# Patient Record
Sex: MALE | Race: WHITE
[De-identification: names, ages, dates, MRNs, and addresses within clinical notes are randomized per-mention and may not be internally consistent; named-entity substitution may affect disease eponyms.]

---

## 2018-11-23 ENCOUNTER — HOSPITAL ENCOUNTER (INPATIENT)
Dept: HOSPITAL 97 - ER | Age: 73
LOS: 1 days | Discharge: HOME | DRG: 641 | End: 2018-11-24
Attending: FAMILY MEDICINE | Admitting: FAMILY MEDICINE
Payer: COMMERCIAL

## 2018-11-23 DIAGNOSIS — I95.9: ICD-10-CM

## 2018-11-23 DIAGNOSIS — E86.0: Primary | ICD-10-CM

## 2018-11-23 DIAGNOSIS — Z88.0: ICD-10-CM

## 2018-11-23 DIAGNOSIS — Z79.82: ICD-10-CM

## 2018-11-23 DIAGNOSIS — N28.1: ICD-10-CM

## 2018-11-23 DIAGNOSIS — K20.9: ICD-10-CM

## 2018-11-23 DIAGNOSIS — E78.49: ICD-10-CM

## 2018-11-23 DIAGNOSIS — K29.70: ICD-10-CM

## 2018-11-23 DIAGNOSIS — R19.7: ICD-10-CM

## 2018-11-23 DIAGNOSIS — I10: ICD-10-CM

## 2018-11-23 DIAGNOSIS — R55: ICD-10-CM

## 2018-11-23 LAB
ALBUMIN SERPL BCP-MCNC: 3.4 G/DL (ref 3.4–5)
ALP SERPL-CCNC: 69 U/L (ref 45–117)
ALT SERPL W P-5'-P-CCNC: 27 U/L (ref 12–78)
AST SERPL W P-5'-P-CCNC: 19 U/L (ref 15–37)
BUN BLD-MCNC: 18 MG/DL (ref 7–18)
GLUCOSE SERPLBLD-MCNC: 115 MG/DL (ref 74–106)
HCT VFR BLD CALC: 47.8 % (ref 39.6–49)
INR BLD: 1.02
LYMPHOCYTES # SPEC AUTO: 0.8 K/UL (ref 0.7–4.9)
MAGNESIUM SERPL-MCNC: 2.2 MG/DL (ref 1.8–2.4)
MCH RBC QN AUTO: 33.9 PG (ref 27–35)
MCV RBC: 97.4 FL (ref 80–100)
NT-PROBNP SERPL-MCNC: 73 PG/ML (ref ?–125)
PMV BLD: 9.5 FL (ref 7.6–11.3)
POTASSIUM SERPL-SCNC: 4.2 MMOL/L (ref 3.5–5.1)
RBC # BLD: 4.91 M/UL (ref 4.33–5.43)
TROPONIN (EMERG DEPT USE ONLY): < 0.02 NG/ML (ref 0–0.04)

## 2018-11-23 PROCEDURE — 97163 PT EVAL HIGH COMPLEX 45 MIN: CPT

## 2018-11-23 PROCEDURE — 84484 ASSAY OF TROPONIN QUANT: CPT

## 2018-11-23 PROCEDURE — 81003 URINALYSIS AUTO W/O SCOPE: CPT

## 2018-11-23 PROCEDURE — 96361 HYDRATE IV INFUSION ADD-ON: CPT

## 2018-11-23 PROCEDURE — 70450 CT HEAD/BRAIN W/O DYE: CPT

## 2018-11-23 PROCEDURE — 80076 HEPATIC FUNCTION PANEL: CPT

## 2018-11-23 PROCEDURE — 83735 ASSAY OF MAGNESIUM: CPT

## 2018-11-23 PROCEDURE — 96367 TX/PROPH/DG ADDL SEQ IV INF: CPT

## 2018-11-23 PROCEDURE — 96375 TX/PRO/DX INJ NEW DRUG ADDON: CPT

## 2018-11-23 PROCEDURE — 74177 CT ABD & PELVIS W/CONTRAST: CPT

## 2018-11-23 PROCEDURE — 85025 COMPLETE CBC W/AUTO DIFF WBC: CPT

## 2018-11-23 PROCEDURE — 93880 EXTRACRANIAL BILAT STUDY: CPT

## 2018-11-23 PROCEDURE — 72125 CT NECK SPINE W/O DYE: CPT

## 2018-11-23 PROCEDURE — 99285 EMERGENCY DEPT VISIT HI MDM: CPT

## 2018-11-23 PROCEDURE — 36415 COLL VENOUS BLD VENIPUNCTURE: CPT

## 2018-11-23 PROCEDURE — 80048 BASIC METABOLIC PNL TOTAL CA: CPT

## 2018-11-23 PROCEDURE — 71045 X-RAY EXAM CHEST 1 VIEW: CPT

## 2018-11-23 PROCEDURE — 85610 PROTHROMBIN TIME: CPT

## 2018-11-23 PROCEDURE — 80053 COMPREHEN METABOLIC PANEL: CPT

## 2018-11-23 PROCEDURE — 93005 ELECTROCARDIOGRAM TRACING: CPT

## 2018-11-23 PROCEDURE — 83880 ASSAY OF NATRIURETIC PEPTIDE: CPT

## 2018-11-23 PROCEDURE — 96365 THER/PROPH/DIAG IV INF INIT: CPT

## 2018-11-23 RX ADMIN — SODIUM CHLORIDE SCH MLS: 0.9 INJECTION, SOLUTION INTRAVENOUS at 23:20

## 2018-11-23 RX ADMIN — SODIUM CHLORIDE SCH: 0.9 INJECTION, SOLUTION INTRAVENOUS at 18:21

## 2018-11-23 NOTE — ER
Nurse's Notes                                                                                     

 [facility]                                                                

Name: Dejuan Aguilar                                                                                

Age: 73 yrs                                                                                       

Sex: Male                                                                                         

: 1945                                                                                   

MRN: [id number]                                                                                   

Arrival Date: 2018                                                                          

Time: 12:48                                                                                       

Account#: [id number]                                                                            

Bed 13                                                                                            

Private MD: Heriberto Harris                                                                         

Diagnosis: Dehydration;Orthostatic hypotension;Syncope and collapse;Colitis                       

                                                                                                  

Presentation:                                                                                     

                                                                                             

12:48 Presenting complaint: EMS states: pts family called he had a syncopal episode,          tw2 

      +orthostatic pressures for us with dizziness when standing, c/o abdominal pain that         

      started last night, loose stools x1day, Hx HTN. Transition of care: patient was not         

      received from another setting of care. Onset of symptoms was 2018. Risk        

      Assessment: Do you want to hurt yourself or someone else? Patient reports no desire to      

      harm self or others. Initial Sepsis Screen: Does the patient meet any 2 criteria? No.       

      Patient's initial sepsis screen is negative. Does the patient have a suspected source       

      of infection? No. Patient's initial sepsis screen is negative. Care prior to arrival:       

      Medication(s) given: Normal saline infusion, 400 ml IV initiated. 18 GA, in the left        

      antecubital area.                                                                           

12:48 Method Of Arrival: EMS: [facility]                                                   tw2 

12:48 Acuity: HAY 3                                                                           tw2 

                                                                                                  

Historical:                                                                                       

- Allergies:                                                                                      

12:57 PENICILLINS;                                                                            tw2 

- Home Meds:                                                                                      

12:57 Cymbalta oral oral [Active]; lisinopril-hydrochlorothiazide oral oral [Active];         tw2 

      atorvastatin oral oral [Active]; aspirin 81 mg Oral chew 1 tab once daily [Active];         

- PMHx:                                                                                           

12:57 Hypertension; Hyperlipidemia;                                                           tw2 

                                                                                                  

- Immunization history:: Adult Immunizations.                                                     

- Social history:: Smoking status: .                                                              

- Ebola Screening: : Patient denies travel to an Ebola-affected area in the 21 days               

  before illness onset.                                                                           

                                                                                                  

                                                                                                  

Screenin:48 Abuse screen: Denies threats or abuse. Nutritional screening: No deficits noted.        tw2 

      Tuberculosis screening: No symptoms or risk factors identified. Fall Risk None              

      identified.                                                                                 

                                                                                                  

Assessment:                                                                                       

12:50 General: Appears in no apparent distress. Behavior is calm, cooperative, appropriate    tw2 

      for age. Pain: Denies pain. Neuro: Level of Consciousness is awake, alert, obeys            

      commands, Oriented to person, place, time, situation. Cardiovascular: Reports               

      lightheadedness, Heart tones S1 S2 Capillary refill < 3 seconds Patient's skin is warm      

      and dry. Respiratory: Airway is patent Respiratory effort is even, unlabored,               

      Respiratory pattern is regular, symmetrical, Breath sounds are clear bilaterally. GI:       

      Abdomen is round non-distended, Bowel sounds present X 4 quads. Reports lower abdominal     

      pain, upper abdominal pain, nausea. : No signs and/or symptoms were reported              

      regarding the genitourinary system. EENT: No signs and/or symptoms were reported            

      regarding the EENT system. Derm: Bruising that is dark purple, on around right eye and      

      eyebrow. Musculoskeletal: Capillary refill < 3 seconds, Range of motion: intact in all      

      extremities.                                                                                

13:20 Reassessment: pts family at bedside report pt having seizure, SARTHAK Tijerina and FREDIS Mckinnon  tw2 

      at bedside, pt is cool and diaphoretic, pts position in bed was lowered to supine,          

      fluids started at this time, will continue to monitor.                                      

13:51 Reassessment: Patient appears in no apparent distress at this time. Patient and/or      tw2 

      family updated on plan of care and expected duration. Pain level reassessed. Patient is     

      alert, oriented x 3, equal unlabored respirations, skin warm/dry/pink.                      

14:48 Reassessment: Patient appears in no apparent distress at this time. Patient and/or      tw2 

      family updated on plan of care and expected duration. Pain level reassessed. Patient is     

      alert, oriented x 3, equal unlabored respirations, skin warm/dry/pink.                      

15:00 Reassessment: Patient appears in no apparent distress at this time. Patient and/or      tw2 

      family updated on plan of care and expected duration. Pain level reassessed. Patient is     

      alert, oriented x 3, equal unlabored respirations, skin warm/dry/pink.                      

16:03 Reassessment: Patient appears in no apparent distress at this time. Patient and/or      tw2 

      family updated on plan of care and expected duration. Pain level reassessed. Patient is     

      alert, oriented x 3, equal unlabored respirations, skin warm/dry/pink.                      

17:00 Reassessment: Patient appears in no apparent distress at this time. Patient and/or      tw2 

      family updated on plan of care and expected duration. Pain level reassessed. Patient is     

      alert, oriented x 3, equal unlabored respirations, skin warm/dry/pink.                      

17:53 Reassessment: Patient appears in no apparent distress at this time. Patient and/or      tw2 

      family updated on plan of care and expected duration. Pain level reassessed. Patient is     

      alert, oriented x 3, equal unlabored respirations, skin warm/dry/pink.                      

                                                                                                  

Vital Signs:                                                                                      

12:54 BP 98 / 63; Pulse 87; Resp 17; Temp 97.6(TE); Pulse Ox 98% on R/A;                      tw2 

12:54 Weight 82.55 kg (R); Height 6 ft. 0 in. (182.88 cm); Pain 0/10;                         tw2 

13:47  / 72 Supine; Pulse 78; Resp 14; Pulse Ox 97% on 2 lpm NC;                        tw2 

14:08  / 86 Sitting; Pulse 90;                                                          tw2 

14:08  / 79 Supine; Pulse 81; Resp 17; Pulse Ox 99% on 2 lpm NC;                        tw2 

14:47  / 76; Pulse 81; Resp 19; Pulse Ox 99% on R/A;                                    tw2 

15:15  / 80; Pulse 69; Resp 17; Pulse Ox 96% on R/A;                                    tw2 

16:03  / 70; Pulse 72; Resp 20; Pulse Ox 95% on R/A;                                    tw2 

17:00  / 90; Pulse 79; Resp 17; Pulse Ox 95% on R/A;                                    tw2 

17:50  / 77; Pulse 88; Resp 17; Pulse Ox 95% on R/A;                                    tw2 

12:54 Body Mass Index 24.68 (82.55 kg, 182.88 cm)                                             tw2 

12:54 "no pain, just stomach discomfort"                                                      tw2 

13:47 pt placed on 2l nc at "seizure" episode                                                 tw2 

14:08 provider JERALD Nash PA at bedside                                                          tw2 

                                                                                                  

ED Course:                                                                                        

12:48 Patient arrived in ED.                                                                  tw2 

12:48 Bed in low position. Call light in reach. Side rails up X2. Adult w/ patient. Cardiac   tw2 

      monitor on. Pulse ox on. NIBP on.                                                           

12:50 Saad Banda PA is PHCP.                                                               jr8 

12:50 Andres Bower MD is Attending Physician.                                             jr8 

12:52 Melanie Fontana RN is Primary Nurse.                                                        tw2 

12:53 Heriberto Harris MD is Private Physician.                                                 rg4 

12:54 Triage completed.                                                                       tw2 

12:56 Arm band placed on.                                                                     tw2 

14:13 XRAY Chest (1 view) In Process Unspecified.                                             EDMS

14:28 CT Head C Spine In Process Unspecified.                                                 EDMS

14:39 CT Abd/Pelvis - W/Contrast In Process Unspecified.                                      EDMS

15:31 Obdulia Conner MD is Hospitalizing Provider.                                           jr8 

16:48 Awaiting: attempted to call report, was told that nurse just got a pt, asked to speak   tw2 

      with charge nurse as ER is full and the room here is needed, was told SARTHAK Pat will        

      have to call me back.                                                                       

17:32 Awaiting: attempted to call report, was told that sarthak pat is not available for report tw2 

      and the charge nurse is discharging a pt.                                                   

17:49 Awaiting: attempted to call report to SARTHAK Pat at this time, SARTHAK Loo took report.    tw2 

17:54 No provider procedures requiring assistance completed. Patient admitted, IV remains in  tw2 

      place.                                                                                      

                                                                                                  

Administered Medications:                                                                         

13:20 Drug: NS 0.9% 1000 ml Route: IV; Rate: 1 bolus; Site: left antecubital;                 tw2 

14:08 Follow up: Response: No adverse reaction; IV Status: Completed infusion; IV Intake:     tw2 

      1000ml                                                                                      

13:26 Drug: Zofran 4 mg Route: IVP; Site: left antecubital;                                   tw2 

14:08 Follow up: Response: No adverse reaction; Nausea is decreased                           tw2 

15:40 Drug: ProTONIX 40 mg Route: IVP; Site: left antecubital;                                tw2 

16:21 Follow up: Response: No adverse reaction                                                tw2 

15:45 Drug: Flagyl 500 mg Volume: 100 ml; Route: IVPB; Rate: 200 ml/hr; Infused Over: 30      tw2 

      mins; Site: left antecubital;                                                               

16:21 Follow up: Response: No adverse reaction; IV Status: Completed infusion                 tw2 

15:45 Drug: NS 0.9% 1000 ml Route: IV; Rate: 125 ml/hr; Site: left antecubital;               tw2 

16:50 Follow up: IV Status: Infusion continued upon admission                                 tw2 

16:21 Drug: Cipro 400 mg Volume: 200 ml; Route: IVPB; Infused Over: 60 mins; Site: left       tw2 

      antecubital;                                                                                

17:53 Follow up: Response: No adverse reaction; IV Status: Completed infusion                 tw2 

                                                                                                  

                                                                                                  

Intake:                                                                                           

14:08 IV: 1000ml; Total: 1000ml.                                                              tw2 

                                                                                                  

Outcome:                                                                                          

15:31 Decision to Hospitalize by Provider.                                                    susan 

17:53 Admitted to Med/surg accompanied by tech, via wheelchair, room 215, with chart, Report  tw2 

      called to  Cinda Arango RN                                                                 

17:53 Condition: stable                                                                           

17:53 Instructed on the need for admit.                                                           

18:02 Patient left the ED.                                                                    ss  

                                                                                                  

Signatures:                                                                                       

Dispatcher [employer]                           EDMS                                                 

Lilliana Denson, RN                      RN   ss                                                   

Saad Banda, PA                        PA   jr8                                                  

Melanie Fontana RN RN   tw2                                                  

Yeni Ann                                 rg4                                                  

                                                                                                  

Corrections: (The following items were deleted from the chart)                                    

14:10 13:47  / 72 Supine; Pulse 78bpm; Resp 14bpm; Pulse Ox 97% RA; tw2                 tw2 

14:49 12:50 Derm: No signs and/or symptoms reported regarding the dermatologic system. tw2    tw2 

16:52 16:48 Awaiting: attempted to call report, was told that nurse just got a pt, asked to   tw2 

      speak with charge nurse as ER is full and the room here is needed tw2                       

17:50 17:49 Awaiting: attempted to call report to SARTHAK Pat tw2                               tw2 

17:55 17:49 Awaiting: attempted to call report to SARTHAK Pat at this time. tw2                 tw2 

                                                                                                  

**************************************************************************************************

## 2018-11-23 NOTE — XMS REPORT
[facility]

 Created on:2018



Patient:Dejuan Aguilar

Sex:Male

:1945

External Reference #:[id number]





Demographics







 Address  [address] [place] [id number]

 

 Phone  [phone]

 

 Preferred Language  en

 

 Marital Status  Unknown

 

 Yarsani Affiliation  Unknown

 

 Race  White

 

 Ethnic Group  Unknown









Author







 Organization  [employer]









Care Team Providers







 Name  Role  Phone

 

 Bucky Overton  Provider Role  Unavailable









Allergies, Adverse Reactions, Alerts







 Substance  Reaction  Event Type

 

 PCN  swelling in feet  Drug Allergy







Problems







 Problem Type  Condition  Code  Onset Dates  Condition Status

 

 Problem  Vitamin B12 deficiency  E53.8    Active

 

 Problem  Family history of colon cancer  Z80.0    Active

 

 Problem  Hyperlipidemia  E78.5    Active

 

 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



   shoulder      

 

 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



   knees      

 

 Problem  Alcohol abuse  F10.10    Active

 

 Problem  Rectus diastasis  M62.08    Active

 

 Problem  Hemorrhoids  K64.9    Active

 

 Problem  Primary osteoarthritis of left  M19.012    Active



   shoulder      

 

 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

 

 Assessment  Pain, joint, hand, left  M25.542    Active

 

 Problem  Insomnia  G47.00    Active

 

 Assessment  Swelling of left middle finger  M79.89    Active

 

 Problem  Benign essential HTN  I10    Active







Medications







 Medication  Code  Code  Instructions  Start  End  Status  Dosage



   System      Date  Date    

 

 Turmeric  NDC  45332016935  500 MG Orally      Active  as directed

 

 Benadryl  NDC  92881258941  25 MG Orally      Active  1 capsule



       every 8 hrs        as needed

 

 Flonase  NDC  00593640464  50 MCG/ACT      Active  1 spray in



       Nasally Once a        each



       day        nostril

 

 Zestoretic  NDC  14917699687  20-25 MG Orally      Active  1 tablet



       Once a day        

 

 Vitamin B-12  NDC  96690203907  1000 MCG Orally      Active  1 tablet



       Once a day        

 

 Melatonin  NDC  24569932747  10 MG Orally      Active  as directed

 

 Aspirin  NDC  56692694366  81 MG Orally      Active  1 tablet



       Once a day        

 

 Celebrex  NDC  29988421299  100 MG Orally      Active  1 capsule



       Once a day        with food

 

 Lipitor  NDC  88551217855  20 MG Orally  April    Active  1 tablet



       Once a day  10, 2018      

 

 Zyrtec Allergy  ND  70083664395  10 MG Orally      Active  1 tablet



       Once a day        







Results

No Known Results



Summary Purpose

[employer] Submission

## 2018-11-23 NOTE — RAD REPORT
EXAM DESCRIPTION:  CT - Abdomen   Pelvis W Contrast - 11/23/2018 2:39 pm

 

CLINICAL HISTORY:  Abdominal pain

 

COMPARISON:  January 2015

 

TECHNIQUE:  Biphasic, helical CT imaging of the abdomen and pelvis was performed following 100 ml non
-ionic IV contrast. No oral contrast administered.

 

All CT scans are performed using dose optimization technique as appropriate and may include automated
 exposure control or mA/KV adjustment according to patient size.

 

FINDINGS:  No suspicious findings in the lung bases.

 

The liver, spleen, and pancreas show no suspicious findings. Gallbladder and biliary tree are also wi
thout suspicious finding.

 

Symmetric renal function is seen with no hydronephrosis or suspicious renal mass. No pyelonephritis o
r acute renal parenchymal process. Large left renal cyst is present not significantly different from 
2015. No suspicious change in characteristics. Urinary bladder is mostly contracted.

 

Distal esophagus near the GE junction is mildly prominent. The patient has a minimal hiatal hernia. T
here is a minimal amount of fluid adjacent to the GE junction. Walls of the gastric antrum are thicke
asrtid and edematous. No outlet obstructive mass. Small bowel loops are not dilated though there are sev
eral distal small bowel loops showing mildly prominent walls. Congestion or edema is present surround
ing the distal rectum. Moderate stool volume is present. There is minimal stranding in the fatty tiss
ues adjacent to the proximal sigmoid colon which is tortuous and redundant.

 

No free air or pneumatosis. There is a small quantity of free fluid in the dependent portion of the p
bridgette. No abscess or surgically emergent finding. The appendix is normal. No bulky lymphadenopathy or
 mass. Patient does have bilateral fat only inguinal hernias. There is evidence for prior repair of a
 periumbilical hernia.

 

Disc and bony degenerative changes are present. No pathologic bone process.  Tarlov cyst formation in
 the sacrum is present as an incidental finding.

 

 

IMPRESSION:  No bowel obstruction, free air or surgically emergent finding.

 

Patient has a multifocal gastroenteritis pattern.  Patient shows evidence for antritis, GE junction/d
istal esophagitis, mild distal small bowel enteritis as well as mild colitis findings of the mid sigm
oid colon and distal rectum.

## 2018-11-23 NOTE — RAD REPORT
EXAM DESCRIPTION:  CT - [id number] - 11/23/2018 2:28 pm

 

CLINICAL HISTORY:  Syncope, dizziness, fall

 

COMPARISON:  None.

 

TECHNIQUE:  Axial 5 mm thick images of the head were obtained.  Axial 2 mm thick images of the cervic
al spine were obtained with sagittal and coronal reconstruction images generated and reviewed.

 

All CT scans are performed using dose optimization technique as appropriate and may include automated
 exposure control or mA/KV adjustment according to patient size.

 

FINDINGS:

No intracranial hemorrhage, mass, edema or acute intracranial finding. No suspicion for acute infarct
ion. Mild atrophy and chronic ischemic changes are present. Ventricles are in proportion to volume lo
ss. No cortical edema or sulcal effacement. Mastoid air cells are clear. Minimal frontal sinus mucosa
l thickening. No air-fluid level. Significant left deviation of the nasal septum. No globe or orbit a
bnormality seen.

 

Cervical body height and alignment are normal. C4-5, C5-6 and C6-7 disc space narrowing present. Cent
ral canal detail is inherently limited. Significant right foraminal stenosis at C4-5 and bilateral at
 C5-6. Moderate bilateral C6-7 foraminal stenosis. No fracture or acute bony abnormality.

 

No paraspinal mass or hematoma.

 

IMPRESSION:  No hemorrhage, edema or acute intracranial finding. Patient has mild atrophy and chronic
 ischemic change.

 

Cervical degenerative changes are present without fracture or acute finding.

## 2018-11-23 NOTE — XMS REPORT
[facility]

 Created on:2018



Patient:Dejuan Aguilar

Sex:Male

:1945

External Reference #:[id number]





Demographics







 Address  [address] [place] [id number]

 

 Phone  [phone]

 

 Preferred Language  en

 

 Marital Status  Unknown

 

 Mandaeism Affiliation  Unknown

 

 Race  White

 

 Ethnic Group  Unknown









Author







 Organization  [employer]









Care Team Providers







 Name  Role  Phone

 

 Heriberto Harris  Provider Role  Unavailable









Allergies, Adverse Reactions, Alerts







 Substance  Reaction  Event Type

 

 PCN  swelling in feet  Drug Allergy







Problems







 Problem Type  Condition  Code  Onset Dates  Condition Status

 

 Problem  Vitamin B12 deficiency  E53.8    Active

 

 Problem  Family history of colon cancer  Z80.0    Active

 

 Problem  Hyperlipidemia  E78.5    Active

 

 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



   shoulder      

 

 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



   knees      

 

 Problem  Alcohol abuse  F10.10    Active

 

 Problem  Rectus diastasis  M62.08    Active

 

 Problem  Hemorrhoids  K64.9    Active

 

 Problem  Primary osteoarthritis of left  M19.012    Active



   shoulder      

 

 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



   shoulder      

 

 Assessment  Abnormal kidney function  N28.9    Active

 

 Assessment  Alcohol abuse  F10.10    Active

 

 Assessment  Umbilical hernia without  K42.9    Active



   obstruction and without gangrene      

 

 Assessment  Benign essential HTN  I10    Active

 

 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



   knees      

 

 Problem  Insomnia  G47.00    Active

 

 Assessment  Hyperlipidemia  E78.5    Active

 

 Problem  Benign essential HTN  I10    Active







Medications







 Medication  Code  Code  Instructions  Start  End  Status  Dosage



   System      Date  Date    

 

 Flonase  NDC  74282255443  50 MCG/ACT      Active  1 spray in



       Nasally Once a        each



       day        nostril

 

 Zyrtec Allergy  NDC  01034940026  10 MG Orally      Active  1 tablet



       Once a day        

 

 Benadryl  NDC  72610904797  25 MG Orally      Active  1 capsule



       every 8 hrs        as needed

 

 Celebrex  NDC  85520276883  100 MG Orally      Active  1 capsule



       Once a day        with food

 

 Vitamin B-12  NDC  15708352214  1000 MCG Orally      Active  1 tablet



       Once a day        

 

 Zestoretic  NDC  88187485031  20-25 MG Orally      Active  1 tablet



       Once a day        

 

 Turmeric  NDC  63417270422  500 MG Orally      Active  as directed

 

 Melatonin  NDC  74822324128  10 MG Orally      Active  as directed

 

 Aspirin  NDC  44145684247  81 MG Orally      Active  1 tablet



       Once a day        

 

 Lipitor  NDC  72407879959  20 MG Orally  April    Active  1 tablet



       Once a day  10, 2018      







Results

No Known Results



Summary Purpose

[employer] Submission

## 2018-11-23 NOTE — RAD REPORT
EXAM DESCRIPTION:  RAD - Chest Single View - 11/23/2018 2:12 pm

 

CLINICAL HISTORY:  Chest pain, abdominal pain

 

COMPARISON:  None.

 

TECHNIQUE:  AP portable chest image was obtained 1345 hours .

 

FINDINGS:  Lung volumes are low. No focal mass or consolidation. Interstitial markings are prominent 
with the baseline unknown. Vasculature is mildly prominent. Heart size is normal range. No measurable
 pleural effusion and no pneumothorax. No acute bony abnormality seen. No acute aortic findings suspe
cted.

 

IMPRESSION:  Baseline examination showing prominent interstitial markings.

 

Mild interstitial edema or infiltrate not excluded.

## 2018-11-24 LAB
ALBUMIN SERPL BCP-MCNC: 2.9 G/DL (ref 3.4–5)
ALP SERPL-CCNC: 59 U/L (ref 45–117)
ALT SERPL W P-5'-P-CCNC: 21 U/L (ref 12–78)
AST SERPL W P-5'-P-CCNC: 16 U/L (ref 15–37)
BUN BLD-MCNC: 13 MG/DL (ref 7–18)
GLUCOSE SERPLBLD-MCNC: 128 MG/DL (ref 74–106)
HCT VFR BLD CALC: 39 % (ref 39.6–49)
LYMPHOCYTES # SPEC AUTO: 0.8 K/UL (ref 0.7–4.9)
MCH RBC QN AUTO: 33.7 PG (ref 27–35)
MCV RBC: 97.4 FL (ref 80–100)
PMV BLD: 9.5 FL (ref 7.6–11.3)
POTASSIUM SERPL-SCNC: 4.4 MMOL/L (ref 3.5–5.1)
RBC # BLD: 4 M/UL (ref 4.33–5.43)

## 2018-11-24 RX ADMIN — Medication SCH ML: at 09:00

## 2018-11-24 RX ADMIN — METRONIDAZOLE SCH MLS: 500 INJECTION, SOLUTION INTRAVENOUS at 00:32

## 2018-11-24 RX ADMIN — Medication SCH ML: at 00:32

## 2018-11-24 RX ADMIN — METRONIDAZOLE SCH MLS: 500 INJECTION, SOLUTION INTRAVENOUS at 10:10

## 2018-11-24 NOTE — RAD REPORT
EXAM DESCRIPTION:  US - CP - 11/23/2018 9:32 pm

 

CLINICAL HISTORY:  Syncope

 

COMPARISON:  None.

 

TECHNIQUE:  Real-time sonographic evaluation of both carotid systems was performed. Gray scale and Do
ppler interrogation were performed with waveform tracing bilaterally.

 

FINDINGS:  Normal high resistance waveforms are noted in both external carotid arteries. The common c
arotid arteries and internal carotid arteries show normal low resistance waveforms.

 

Bilateral carotid bulb calcified plaquing changes are present with right common carotid calcified randy
quing changes as well. On visual inspection no significant luminal narrowing identified. No dissectio
n. Peak systolic and end diastolic velocity values and the ICA/CCA ratios are in the non-hemodynamica
lly significant range.

 

Antegrade flow seen in both vertebral arteries.

 

Velocity values and ratios were recorded and are retained in the patient's imaging records.

 

 

IMPRESSION:  Bilateral calcified plaquing changes are present. However, no significant stenosis ident
ified.

## 2018-11-24 NOTE — P.SSS
Patient History


Date of Service: 11/24/18


Reason for admission: Syncope


History of Present Illness: 





Patient is a 73-year-old gentleman who was in his house when he passed out.  He 

had gone to the bathroom and was having diarrhea and felt nauseated.  He felt 

he was going to vomit but then he passed out.  He came around he thinks about 

30 seconds later.  He was not feeling like himself so he came in to the 

emergency room for further evaluation.





Patient also has a cough and says he feels like something is caught up in the 

back of his throat.  He is not really sure what is causing it.  His CT scan 

revealed gastritis and esophagitis.  Patient needs to be evaluated for further 

treatment.


Allergies





Penicillins Allergy (Verified 11/23/18 18:19)


 Itching





Home Medications: 








Aspirin [Aspirin EC 81 MG] 81 mg PO DAILY 11/23/18 


Atorvastatin Calcium [Lipitor*] 20 mg PO DAILY 11/23/18 


Lisinopril/Hydrochlorothiazide [Zestoretic 20-25 mg Tablet] 1 each PO DAILY 11/ 23/18 


Pantoprazole Sodium [Protonix] 40 mg PO DAILY #30 tab 11/24/18 








- Past Medical/Surgical History


Has patient received pneumonia vaccine in the past: Yes


Diabetic: No


-: Hypertension


-: Cyst in Kidney


-: Umbilical hernia repair


-: Eye surgery





- Family History


  ** Father


-: Heart disease, Cancer





  ** Mother


-: Heart disease, Hypertension





- Social History


Smoking Status: Never smoker


Alcohol use: Yes


CD- Drugs: No


Caffeine use: Yes


Place of Residence: Home





Review of Systems


10-point ROS is otherwise unremarkable





Physical Examination





- Vital Signs


Temperature: 98.0 F


Blood Pressure: 149/80


Pulse: 84


Respirations: 16


Pulse Ox (%): 97





- Physical Exam


General: Alert, In no apparent distress


HEENT: Atraumatic, PERRLA, Mucous membr. moist/pink, EOMI, Sclerae nonicteric


Neck: Supple, 2+ carotid pulse no bruit, No LAD, Without JVD or thyroid 

abnormality


Respiratory: Clear to auscultation bilaterally, Normal air movement


Cardiovascular: Regular rate/rhythm, Normal S1 S2


Gastrointestinal: Normal bowel sounds, No tenderness


Musculoskeletal: No tenderness


Integumentary: No rashes


Neurological: Normal gait, Normal speech, Normal strength at 5/5 x4 extr, 

Normal tone, Normal affect


Lymphatics: No axilla or inguinal lymphadenopathy


Treatment Summary: 





Overall during the hospital stay patient remained stable





Patient was initially admitted to the hospital for syncope most likely 

secondary to dehydration secondary to diarrhea.  Patient has been doing well 

overall.  Patient had extensive workup done here for syncopal episode here in 

the hospital.  All the workup was negative for any acute abnormality.  Patient 

then ambulated with physical therapy and thus was discharged home under stable 

condition.  Patient's syncopal episode did resolve while here in the hospital.  

No other complaints to offer.  Patient was asked to follow up with his primary 

care provider in about 1-2 days post discharge.





- Disposition


Disposition: ROUTINE DISCHARGE


Condition: GOOD


Diet: Regular


Activity: Ad praveen

## 2018-11-24 NOTE — P.HP
Certification for Inpatient


Patient admitted to: Observation


With expected LOS: <2 Midnights


Patient will require the following post-hospital care: None


Practitioner: I am a practitioner with admitting privileges, knowledge of 

patient current condition, hospital course, and medical plan of care.


Services: Services provided to patient in accordance with Admission 

requirements found in Title 42 Section 412.3 of the Code of Federal Regulations





Patient History


Date of Service: 11/23/18


Reason for admission: Syncope


History of Present Illness: 





Patient is a 73-year-old gentleman who was in his house when he passed out.  He 

had gone to the bathroom and was having diarrhea and felt nauseated.  He felt 

he was going to vomit but then he passed out.  He came around he thinks about 

30 seconds later.  He was not feeling like himself so he came in to the 

emergency room for further evaluation.





Patient also has a cough and says he feels like something is caught up in the 

back of his throat.  He is not really sure what is causing it.  His CT scan 

revealed gastritis and esophagitis.  Patient needs to be evaluated for further 

treatment.


Allergies





Penicillins Allergy (Verified 11/23/18 18:19)


 Itching





Home Medications: 








Aspirin [Aspirin EC 81 MG] 81 mg PO DAILY 11/23/18 


Atorvastatin Calcium [Lipitor] 20 mg PO DAILY 11/23/18 


Lisinopril/Hydrochlorothiazide [Zestoretic 20-25 mg Tablet] 1 each PO DAILY 11/ 23/18 








- Past Medical/Surgical History


Has patient received pneumonia vaccine in the past: Yes


Diabetic: No


-: Hypertension


-: Cyst in Kidney


-: Umbilical hernia repair


-: Eye surgery





- Family History


  ** Father


Medical History: Heart disease, Cancer





  ** Mother


Medical History: Heart disease, Hypertension





- Social History


Smoking Status: Never smoker


Alcohol use: Yes


CD- Drugs: No


Caffeine use: Yes


Place of Residence: Home





Review of Systems


10-point ROS is otherwise unremarkable





Physical Examination





- Vital Signs


Temperature: 97.4 F


Blood Pressure: 119/66


Pulse: 82


Respirations: 18


Pulse Ox (%): 93





- Physical Exam


General: Alert, In no apparent distress, Oriented x3


HEENT: Atraumatic, PERRLA, Mucous membr. moist/pink, EOMI, Sclerae nonicteric


Neck: Supple, 2+ carotid pulse no bruit, No LAD, Without JVD or thyroid 

abnormality


Respiratory: Clear to auscultation bilaterally, Normal air movement


Cardiovascular: Regular rate/rhythm, Normal S1 S2, No murmurs


Gastrointestinal: Normal bowel sounds, Soft and benign, Non-distended, No 

tenderness, No rebound, No guarding


Musculoskeletal: No clubbing, No swelling, No tenderness


Integumentary: No rashes


Neurological: Normal gait, Normal speech, Normal strength at 5/5 x4 extr, 

Normal tone, Sensation intact, Cranial nerves 3-12 intact, Normal affect


Lymphatics: No axilla or inguinal lymphadenopathy





- Studies


Laboratory Data (last 24 hrs)





11/23/18 13:15: PT 12.0, INR 1.02


11/23/18 13:15: WBC 10.7, Hgb 16.7, Hct 47.8, Plt Count 177


11/23/18 13:15: Sodium 139, Potassium 4.2, BUN 18, Creatinine 1.10, Glucose 115 

H, Magnesium 2.2, Total Bilirubin 0.4, AST 19, ALT 27, Alkaline Phosphatase 69








Assessment & Plan





- Problems (Diagnosis)


(1) Syncope and collapse


Current Visit: Yes   Status: Acute   





(2) Intractable nausea and vomiting


Current Visit: Yes   Status: Acute   





(3) Diarrhea


Current Visit: Yes   Status: Acute   





(4) Esophagitis with gastritis


Current Visit: Yes   Status: Acute   





- Plan





Plan:


1. IV hydration


2. Antiemetics


3. Protonix twice a day


4. Carotid Doppler


5. Echocardiogram


6. Monitor on telemetry


7. GI and DVT prophylaxis


Discharge Plan: Home


Plan to discharge in: 48 Hours





- Advance Directives


Does patient have a Living Will: Yes


Does patient have a Durable POA for Healthcare: Yes





- Code Status/Comfort Care


Code Status Assessed: Yes


Code Status: Full Code


Critical Care: No


Time Spent Managing PTS Care (In Minutes): 50

## 2018-11-24 NOTE — EKG
Test Date:    2018-11-23               Test Time:    13:16:09

Technician:   LAILA                                    

                                                     

MEASUREMENT RESULTS:                                       

Intervals:                                           

Rate:         88                                     

PA:           160                                    

QRSD:         106                                    

QT:           378                                    

QTc:          457                                    

Axis:                                                

P:            26                                     

PA:           160                                    

QRS:          -30                                    

T:            44                                     

                                                     

INTERPRETIVE STATEMENTS:                                       

                                                     

Normal sinus rhythm

Left axis deviation

Minimal voltage criteria for LVH, may be normal variant

Abnormal ECG

No previous ECG available for comparison



Electronically Signed On 11-24-18 07:02:49 CST by Royce Lynne

## 2021-09-10 ENCOUNTER — HOSPITAL ENCOUNTER (OUTPATIENT)
Dept: HOSPITAL 97 - OR | Age: 76
Discharge: HOME | End: 2021-09-10
Attending: SURGERY
Payer: COMMERCIAL

## 2021-09-10 VITALS — TEMPERATURE: 97.2 F

## 2021-09-10 VITALS — OXYGEN SATURATION: 98 % | DIASTOLIC BLOOD PRESSURE: 78 MMHG | SYSTOLIC BLOOD PRESSURE: 122 MMHG

## 2021-09-10 DIAGNOSIS — Z20.822: ICD-10-CM

## 2021-09-10 DIAGNOSIS — Z12.11: Primary | ICD-10-CM

## 2021-09-10 DIAGNOSIS — K64.8: ICD-10-CM

## 2021-09-10 PROCEDURE — 0DJD8ZZ INSPECTION OF LOWER INTESTINAL TRACT, VIA NATURAL OR ARTIFICIAL OPENING ENDOSCOPIC: ICD-10-PCS

## 2021-09-10 RX ADMIN — SODIUM CHLORIDE, SODIUM LACTATE, POTASSIUM CHLORIDE, AND CALCIUM CHLORIDE ONE MLS: .6; .31; .03; .02 INJECTION, SOLUTION INTRAVENOUS at 08:07

## 2021-09-10 RX ADMIN — SODIUM CHLORIDE, SODIUM LACTATE, POTASSIUM CHLORIDE, AND CALCIUM CHLORIDE ONE MLS: .6; .31; .03; .02 INJECTION, SOLUTION INTRAVENOUS at 07:45

## 2021-09-10 NOTE — ENDO RPT
[address], [address]





COLONOSCOPY PROCEDURE REPORT     EXAM DATE: 09/10/2021



PATIENT NAME:      Dejuan Aguilar           MR #:      [id number]

YOB: 1945      VISIT #:     [id number]

ATTENDING:     Dany Crocker DR     STATUS:     outpatient

ASSISTANT:      Mary Mosley RN and Saima Merrill CST



INDICATIONS:  The patient is a 75 yr old Male here for a colonoscopy due to

colon cancer screening

PROCEDURE PERFORMED:     Screening Colonoscopy

MEDICATIONS:     Per Anesthesia.

ESTIMATED BLOOD LOSS:     None



CONSENT: The patient understands the risks and benefits of the procedure and

understands that these risks include, but are not limited to: sedation,

allergic reaction, infection, perforation and/or bleeding. Alternative means of

evaluation and treatment include, among others: physical exam, x-rays, and/or

surgical intervention. The patient elects to proceed with this endoscopic

procedure.



DESCRIPTION OF PROCEDURE:  During intra-op preparation period all mechanical 

medical equipment was checked for proper function. Hand hygiene and appropriate

measures for infection prevention was taken.  Procedure, possible

complications, alternatives including, but not limited to possibility of

bleeding, perforation, tear, infection, sepsis, need for surgery, need for

blood transfusion, were explained to the patient.  After the risks, benefits

and alternatives of the procedure were thoroughly explained, Informed consent

was verified, confirmed and timeout was successfully executed by the treatment

team.  The patient was placed in the left lateral position.   A digital rectal

exam was performed and revealed internal hemorrhoids.  After appropriate level

of anesthesia, the scope was passed.  The EC-3890Li ([id number]) and EC-[id number]

([id number]) endoscope was introduced through the anus and advanced to the hepatic

flexure. The quality of the prep was fair. The instrument was then slowly

withdrawn as the colon was fully examined.  Scope withdrawal time was 10

minutes.



COLON FINDINGS: Incomplete Colonoscopy.  Retroflexed views revealed no

abnormalities. The scope was then completely withdrawn from the patient and the

procedure terminated.







ADVERSE EVENTS:      There were no complications.

IMPRESSIONS:     Incomplete Colonoscopy



RECOMMENDATIONS:     1.  avoid NSAIDS

2.  avoid NSAIDS for 2 weeks

3.  follow-up: office 2 week(s)

4.  Monitor for any evidence of rectal bleeding.

5.  Call office for alternative prep - 2 days

RECALL:     Return in 2 week(s) for Colonoscopy.  Repeat Prep



_____________________________

Dany Crocker DR

eSigned:  Dany Crocker DR 09/10/2021 9:08 AM





cc:



CPT CODES:

ICD9 CODES:

## 2021-10-04 ENCOUNTER — HOSPITAL ENCOUNTER (OUTPATIENT)
Dept: HOSPITAL 97 - OR | Age: 76
Discharge: HOME | End: 2021-10-04
Attending: SURGERY
Payer: COMMERCIAL

## 2021-10-04 VITALS — DIASTOLIC BLOOD PRESSURE: 64 MMHG | TEMPERATURE: 98.1 F | SYSTOLIC BLOOD PRESSURE: 120 MMHG

## 2021-10-04 VITALS — OXYGEN SATURATION: 99 %

## 2021-10-04 DIAGNOSIS — K64.8: ICD-10-CM

## 2021-10-04 DIAGNOSIS — Z20.822: ICD-10-CM

## 2021-10-04 DIAGNOSIS — Z12.11: Primary | ICD-10-CM

## 2021-10-04 PROCEDURE — 0DJD8ZZ INSPECTION OF LOWER INTESTINAL TRACT, VIA NATURAL OR ARTIFICIAL OPENING ENDOSCOPIC: ICD-10-PCS

## 2021-10-04 RX ADMIN — SODIUM CHLORIDE, SODIUM LACTATE, POTASSIUM CHLORIDE, AND CALCIUM CHLORIDE ONE MLS: .6; .31; .03; .02 INJECTION, SOLUTION INTRAVENOUS at 08:23

## 2021-10-04 RX ADMIN — SODIUM CHLORIDE, SODIUM LACTATE, POTASSIUM CHLORIDE, AND CALCIUM CHLORIDE ONE MLS: .6; .31; .03; .02 INJECTION, SOLUTION INTRAVENOUS at 07:50

## 2021-10-04 NOTE — ENDO RPT
[address], [address]





COLONOSCOPY PROCEDURE REPORT     EXAM DATE: 10/04/2021



PATIENT NAME:      Dejuan Aguilar           MR #:      [id number]

YOB: 1945      VISIT #:     [id number]

ATTENDING:     Dany Crocker DR     STATUS:     outpatient

ASSISTANT:      Colette Manzano RN and Ngoc Nava



INDICATIONS:  The patient is a 75 yr old Male here for a colonoscopy due to

colon cancer screening

PROCEDURE PERFORMED:     Screening Colonoscopy and Colonoscopy

MEDICATIONS:     Per Anesthesia.

ESTIMATED BLOOD LOSS:     None



CONSENT: The patient understands the risks and benefits of the procedure and

understands that these risks include, but are not limited to: sedation,

allergic reaction, infection, perforation and/or bleeding. Alternative means of

evaluation and treatment include, among others: physical exam, x-rays, and/or

surgical intervention. The patient elects to proceed with this endoscopic

procedure.



DESCRIPTION OF PROCEDURE:  During intra-op preparation period all mechanical 

medical equipment was checked for proper function. Hand hygiene and appropriate

measures for infection prevention was taken.  Procedure, possible

complications, alternatives including, but not limited to possibility of

bleeding, perforation, tear, infection, sepsis, need for surgery, need for

blood transfusion, were explained to the patient.  After the risks, benefits

and alternatives of the procedure were thoroughly explained, Informed consent

was verified, confirmed and timeout was successfully executed by the treatment

team.  The patient was placed in the left lateral position.   A digital rectal

exam was performed and revealed internal hemorrhoids.  After appropriate level

of anesthesia, the scope was passed.  The EC-3890Li ([id number]) and EC-[id number]

([id number]) endoscope was introduced through the anus and advanced to the cecum.

The quality of the prep was fair. The instrument was then slowly withdrawn as

the colon was fully examined.  Scope withdrawal time was 10 minutes.



COLON FINDINGS: Small internal hemorrhoids were found.   The colon mucosa was

otherwise normal.   The colon was redundant.  Manual abdominal counter-pressure

was used to reach the cecum. The Therapeutic Colonoscope was used.

Retroflexed views revealed no abnormalities. The scope was then completely

withdrawn from the patient and the procedure terminated.







ADVERSE EVENTS:      There were no complications.

IMPRESSIONS:     1.  Small internal hemorrhoids

2.  The colon mucosa was otherwise normal



RECOMMENDATIONS:     1.  fiber rich diet

2.  avoid NSAIDS for 2 weeks

3.  follow-up: office 2 week(s)

4.  Monitor for any evidence of rectal bleeding.

5.  hemorrhoidal hygiene

6.  yearly hemoquant

RECALL:     Return in 3 year(s) for Colonoscopy.  Tortuous Colon



_____________________________

Dany Crocker DR

eSigned:  Dany Crocker DR 10/04/2021 9:27 AM





cc:



CPT CODES:

ICD9 CODES:





PATIENT NAME:  Dejuan Aguilar

MR#: [id number]

## 2021-12-14 LAB
BUN BLD-MCNC: 19 MG/DL (ref 7–18)
GLUCOSE SERPLBLD-MCNC: 100 MG/DL (ref 74–106)
HCT VFR BLD CALC: 45.6 % (ref 39.6–49)
LYMPHOCYTES # SPEC AUTO: 1.6 K/UL (ref 0.7–4.9)
PMV BLD: 8.7 FL (ref 7.6–11.3)
POTASSIUM SERPL-SCNC: 4 MMOL/L (ref 3.5–5.1)
RBC # BLD: 4.7 M/UL (ref 4.33–5.43)

## 2021-12-17 ENCOUNTER — HOSPITAL ENCOUNTER (OUTPATIENT)
Dept: HOSPITAL 97 - OR | Age: 76
Discharge: HOME | End: 2021-12-17
Attending: SURGERY
Payer: COMMERCIAL

## 2021-12-17 VITALS — SYSTOLIC BLOOD PRESSURE: 112 MMHG | TEMPERATURE: 96.8 F | OXYGEN SATURATION: 94 % | DIASTOLIC BLOOD PRESSURE: 72 MMHG

## 2021-12-17 DIAGNOSIS — K21.9: ICD-10-CM

## 2021-12-17 DIAGNOSIS — K43.2: Primary | ICD-10-CM

## 2021-12-17 DIAGNOSIS — Z88.0: ICD-10-CM

## 2021-12-17 DIAGNOSIS — E07.9: ICD-10-CM

## 2021-12-17 DIAGNOSIS — M62.08: ICD-10-CM

## 2021-12-17 DIAGNOSIS — E78.00: ICD-10-CM

## 2021-12-17 DIAGNOSIS — I10: ICD-10-CM

## 2021-12-17 DIAGNOSIS — Z20.822: ICD-10-CM

## 2021-12-17 DIAGNOSIS — J44.9: ICD-10-CM

## 2021-12-17 PROCEDURE — 88302 TISSUE EXAM BY PATHOLOGIST: CPT

## 2021-12-17 PROCEDURE — 80048 BASIC METABOLIC PNL TOTAL CA: CPT

## 2021-12-17 PROCEDURE — 49656: CPT

## 2021-12-17 PROCEDURE — 36415 COLL VENOUS BLD VENIPUNCTURE: CPT

## 2021-12-17 PROCEDURE — 0WUF4JZ SUPPLEMENT ABDOMINAL WALL WITH SYNTHETIC SUBSTITUTE, PERCUTANEOUS ENDOSCOPIC APPROACH: ICD-10-PCS

## 2021-12-17 PROCEDURE — 85025 COMPLETE CBC W/AUTO DIFF WBC: CPT

## 2021-12-17 RX ADMIN — HYDROMORPHONE HYDROCHLORIDE ONE MG: 2 INJECTION INTRAMUSCULAR; INTRAVENOUS; SUBCUTANEOUS at 13:16

## 2021-12-17 RX ADMIN — HYDROMORPHONE HYDROCHLORIDE ONE MG: 2 INJECTION INTRAMUSCULAR; INTRAVENOUS; SUBCUTANEOUS at 13:00

## 2021-12-17 RX ADMIN — HYDROMORPHONE HYDROCHLORIDE ONE MG: 2 INJECTION INTRAMUSCULAR; INTRAVENOUS; SUBCUTANEOUS at 13:10

## 2021-12-17 RX ADMIN — HYDROMORPHONE HYDROCHLORIDE ONE MG: 2 INJECTION INTRAMUSCULAR; INTRAVENOUS; SUBCUTANEOUS at 13:05

## 2021-12-17 NOTE — OP
Date of Procedure:  12/17/2021



Surgeon:  Dany Crocker MD, MD



Preoperative Diagnosis:  Ventral abdominal hernia.



Postoperative Diagnosis:  Ventral abdominal hernia.



Procedure Performed:  Laparoscopic ventral hernia repair with mesh.



Anesthesia:  General endotracheal plus local with 0.5% Marcaine without epinephrine.



Estimated Blood Loss:  Less than 5 cc.



Specimen:  Hernia contents.



Findings:  Midline ventral hernia, 11 cm x 4 cm.  Large midline diastasis recti.



Complications:  None.



Implants:  15.2 cm x 20.3 cm Bard Ventralight ST mesh with Echo Positioning System.



Condition:  The patient was transferred to recovery room in good condition.



Procedure In Detail:  After informed consent was obtained, the patient was brought to the operating r
oom, and prepped and draped in the usual sterile fashion.  After adequate anesthesia was achieved, a 
left upper quadrant area was anesthetized with the 0.5% Marcaine, sharply incised.  A 5 mm 0-degree o
ptical trocar was introduced in the abdomen without evidence of complication.  Insufflation obtained 
to 50 mmHg at this time.  The abdomen was inspected at this point.  There was a large preperitoneal f
at-containing defect in the anterior bowel wall consistent with a shallow ventral hernia.  The patien
t had a quite thin abdominal wall and a significant diastasis recti was appreciated at this point.  I
 chose to place an additional trocar in the left lower quadrant.  This was a 12 mm trocar placed unde
r direct visualization without evidence of any complication after appropriately anesthetizing the ski
n.  At this point, the LigaSure device was used to take the preperitoneal fat contents out of the her
sury to allow for clean landing zone for the mesh.  At this point, the previous mesh was noted in the 
periumbilical position.  As such, I sized the hernia as above and opted for a 15 x 20 cm Bard Ventral
ight ST mesh with Echo Positioning System.  This was brought into the abdomen and deployed appropriat
ely through the central portion of the mesh after appropriately sizing it.  I secured the mesh to the
 anterior abdominal wall using the absorbable fixation tacker SorbaFix.  I then removed the balloon d
eployment system at this point as found to be intact on the back table.  At this point, I continued a
 double crown type positioning of the mesh to the anterior abdominal wall with good apposition of the
 mesh to the anterior abdominal wall.  At this point, no hemostatic measures were required.  The chandu
ent was positioned slightly rotated to the right.  The 12 mm trocar was removed.  The 12 mm trocar si
te was closed using a Nick-Pat suture passer with #1 Vicryl in interrupted fashion.  Good appr
oximation of tissues.  The abdomen was completely desufflated under direct visualization without evid
ence of complication.  All skin incisions were then copiously irrigated and closed with a 4-0 Monocry
l in a running fashion.  Dermabond was placed over the top.  The patient tolerated the procedure well
 without evidence of complication and transferred to PACU in good condition.  All counts were correct
 at the end of the case.





LASHON/YOLANDA

DD:  12/17/2021 12:50:20Voice ID:  [id number]

DT:  12/17/2021 13:18:20Report ID:  [id number]

## 2021-12-17 NOTE — P.OP
Preoperative diagnosis: Ventral Abdominal Hernia


Postoperative diagnosis: Ventral Abdominal Hernia


Primary procedure: Laparoscopic Ventral Hernia Repair with mesh


Anesthesia: GETA + Local


Estimated blood loss: <5cc


Specimen: hernia contents


Findings: midline ventral hernia ~ 11cm x 4 cm, large midline diastasis recti


Complications: None


Implants: 15.2cm x 20.3 cm Bard Ventralite ST mesh with echo position


Transferred to: Recovery Room


Condition: Good

## 2022-07-04 ENCOUNTER — HOSPITAL ENCOUNTER (EMERGENCY)
Dept: HOSPITAL 97 - ER | Age: 77
Discharge: HOME | End: 2022-07-04
Payer: COMMERCIAL

## 2022-07-04 VITALS — SYSTOLIC BLOOD PRESSURE: 107 MMHG | OXYGEN SATURATION: 99 % | DIASTOLIC BLOOD PRESSURE: 78 MMHG

## 2022-07-04 VITALS — TEMPERATURE: 98.8 F

## 2022-07-04 DIAGNOSIS — R53.1: ICD-10-CM

## 2022-07-04 DIAGNOSIS — I10: ICD-10-CM

## 2022-07-04 DIAGNOSIS — R42: Primary | ICD-10-CM

## 2022-07-04 DIAGNOSIS — Z88.0: ICD-10-CM

## 2022-07-04 DIAGNOSIS — R55: ICD-10-CM

## 2022-07-04 LAB
ALBUMIN SERPL BCP-MCNC: 4 G/DL (ref 3.4–5)
ALP SERPL-CCNC: 50 U/L (ref 45–117)
ALT SERPL W P-5'-P-CCNC: 26 U/L (ref 12–78)
AST SERPL W P-5'-P-CCNC: 16 U/L (ref 15–37)
BUN BLD-MCNC: 35 MG/DL (ref 7–18)
GLUCOSE SERPLBLD-MCNC: 95 MG/DL (ref 74–106)
HCT VFR BLD CALC: 42.8 % (ref 39.6–49)
INR BLD: 1.02
LYMPHOCYTES # SPEC AUTO: 1.2 K/UL (ref 0.7–4.9)
MAGNESIUM SERPL-MCNC: 2.5 MG/DL (ref 1.8–2.4)
MCV RBC: 97.8 FL (ref 80–100)
NT-PROBNP SERPL-MCNC: 74 PG/ML (ref ?–450)
PMV BLD: 9.5 FL (ref 7.6–11.3)
POTASSIUM SERPL-SCNC: 4.3 MMOL/L (ref 3.5–5.1)
RBC # BLD: 4.37 M/UL (ref 4.33–5.43)
TROPONIN I SERPL HS-MCNC: 4.7 PG/ML (ref ?–58.9)

## 2022-07-04 PROCEDURE — 99284 EMERGENCY DEPT VISIT MOD MDM: CPT

## 2022-07-04 PROCEDURE — 85025 COMPLETE CBC W/AUTO DIFF WBC: CPT

## 2022-07-04 PROCEDURE — 96360 HYDRATION IV INFUSION INIT: CPT

## 2022-07-04 PROCEDURE — 93005 ELECTROCARDIOGRAM TRACING: CPT

## 2022-07-04 PROCEDURE — 85610 PROTHROMBIN TIME: CPT

## 2022-07-04 PROCEDURE — 36415 COLL VENOUS BLD VENIPUNCTURE: CPT

## 2022-07-04 PROCEDURE — 80076 HEPATIC FUNCTION PANEL: CPT

## 2022-07-04 PROCEDURE — 70450 CT HEAD/BRAIN W/O DYE: CPT

## 2022-07-04 PROCEDURE — 71045 X-RAY EXAM CHEST 1 VIEW: CPT

## 2022-07-04 PROCEDURE — 84484 ASSAY OF TROPONIN QUANT: CPT

## 2022-07-04 PROCEDURE — 96361 HYDRATE IV INFUSION ADD-ON: CPT

## 2022-07-04 PROCEDURE — 83880 ASSAY OF NATRIURETIC PEPTIDE: CPT

## 2022-07-04 PROCEDURE — 80048 BASIC METABOLIC PNL TOTAL CA: CPT

## 2022-07-04 PROCEDURE — 83735 ASSAY OF MAGNESIUM: CPT

## 2022-07-04 NOTE — EDPHYS
Physician Documentation                                                                           

 [facility]                                                                 

Name: Dejuan Aguilar                                                                                

Age: 76 yrs                                                                                       

Sex: Male                                                                                         

: 1945                                                                                   

MRN: [id number]                                                                                   

Arrival Date: 2022                                                                          

Time: 19:51                                                                                       

Account#: [id number]                                                                            

Bed 7                                                                                             

Private MD:                                                                                       

ED Physician Andres Bower                                                                      

HPI:                                                                                              

                                                                                             

21:20 This 76 yrs old  Male presents to ER via EMS with complaints of weakness,ha    lele 

      and dizzy.                                                                                  

21:20 weakness, ha. The patient presents with dizziness, generalized weakness. Onset: The     lele 

      symptoms/episode began/occurred 1 day(s) ago. Context: occurred at home, occurred while     

      the patient was cooking. Modifying factors: The symptoms are alleviated by nothing, the     

      symptoms are aggravated by nothing. Associated signs and symptoms: Pertinent positives:     

      headache, near-syncope. Severity of symptoms: At their worst the symptoms were mild in      

      the emergency department the symptoms are unchanged. Patient's baseline: Neuro:.            

                                                                                                  

Historical:                                                                                       

- Allergies:                                                                                      

20:01 PENICILLINS;                                                                            jb4 

- Home Meds:                                                                                      

20:01 aspirin 81 mg Oral chew 1 tab once daily [Active]; atorvastatin Oral [Active];          jb4 

      lisinopril-hydrochlorothiazide Oral [Active];                                               

- PMHx:                                                                                           

20:01 Hyperlipidemia; Hypertension;                                                           jb4 

- PSHx:                                                                                           

20:01 hernia repair;                                                                          jb4 

                                                                                                  

- Immunization history:: Adult Immunizations up to date.                                          

- Social history:: Smoking status: Patient denies any tobacco usage or history of.                

  Patient uses alcohol, occasionally.                                                             

- Family history:: not pertinent.                                                                 

                                                                                                  

                                                                                                  

ROS:                                                                                              

21:20 Constitutional: Negative for fever, chills, and weight loss, Eyes: Negative for injury, lele 

      pain, redness, and discharge, ENT: Negative for injury, pain, and discharge, Neck:          

      Negative for injury, pain, and swelling, Cardiovascular: Negative for chest pain,           

      palpitations, and edema, Respiratory: Negative for shortness of breath, cough,              

      wheezing, and pleuritic chest pain, Abdomen/GI: Negative for abdominal pain, nausea,        

      vomiting, diarrhea, and constipation, Back: Negative for injury and pain, : Negative      

      for injury, bleeding, discharge, and swelling, MS/Extremity: Negative for injury and        

      deformity, Skin: Negative for injury, rash, and discoloration, Neuro: Negative for          

      headache, weakness, numbness, tingling, and seizure.                                        

21:20 Neuro: Positive for dizziness, weakness, Negative for altered mental status.                

                                                                                                  

Exam:                                                                                             

21:20 Constitutional:  This is a well developed, well nourished patient who is awake, alert,  lele 

      and in no acute distress. Head/Face:  Normocephalic, atraumatic. Eyes:  Pupils equal        

      round and reactive to light, extra-ocular motions intact.  Lids and lashes normal.          

      Conjunctiva and sclera are non-icteric and not injected.  Cornea within normal limits.      

      Periorbital areas with no swelling, redness, or edema. ENT:  Nares patent. No nasal         

      discharge, no septal abnormalities noted.  Tympanic membranes are normal and external       

      auditory canals are clear.  Oropharynx with no redness, swelling, or masses, exudates,      

      or evidence of obstruction, uvula midline.  Mucous membranes moist. Neck:  Trachea          

      midline, no thyromegaly or masses palpated, and no cervical lymphadenopathy.  Supple,       

      full range of motion without nuchal rigidity, or vertebral point tenderness.  No            

      Meningismus. Chest/axilla:  Normal chest wall appearance and motion.  Nontender with no     

      deformity.  No lesions are appreciated. Cardiovascular:  Regular rate and rhythm with a     

      normal S1 and S2.  No gallops, murmurs, or rubs.  Normal PMI, no JVD.  No pulse             

      deficits. Respiratory:  Lungs have equal breath sounds bilaterally, clear to                

      auscultation and percussion.  No rales, rhonchi or wheezes noted.  No increased work of     

      breathing, no retractions or nasal flaring. Abdomen/GI:  Soft, non-tender, with normal      

      bowel sounds.  No distension or tympany.  No guarding or rebound.  No evidence of           

      tenderness throughout. Back:  No spinal tenderness.  No costovertebral tenderness.          

      Full range of motion. Male :  Normal genitalia with no discharge or lesions. Skin:        

      Warm, dry with normal turgor.  Normal color with no rashes, no lesions, and no evidence     

      of cellulitis. MS/ Extremity:  Pulses equal, no cyanosis.  Neurovascular intact.  Full,     

      normal range of motion. Neuro:  Awake and alert, GCS 15, oriented to person, place,         

      time, and situation.  Cranial nerves II-XII grossly intact.  Motor strength 5/5 in all      

      extremities.  Sensory grossly intact.  Cerebellar exam normal.  Normal gait. Psych:         

      Awake, alert, with orientation to person, place and time.  Behavior, mood, and affect       

      are within normal limits.                                                                   

21:20 ECG was reviewed by the Attending Physician.                                                

                                                                                                  

Vital Signs:                                                                                      

19:58  / 64; Pulse 65; Resp 16; Temp 97.8(O); Pulse Ox 95% ; Weight 78.02 kg (R);       jb4 

      Height 6 ft. 0 in. (182.88 cm) (R); Pain 0/10;                                              

20:51  / 69; Pulse 79; Resp 12 S; Temp 98.8; Pulse Ox 99% on R/A;                       as6 

21:40  / 67; Pulse 64; Resp 16 S; Pulse Ox 96% on R/A;                                  as6 

23:15  / 78; Pulse 60; Resp 15 S; Pulse Ox 99% on R/A; Pain 0/10;                       as6 

19:58 Body Mass Index 23.33 (78.02 kg, 182.88 cm)                                             jb4 

                                                                                                  

MDM:                                                                                              

20:24 Patient medically screened.                                                             lele 

21:25 Differential Diagnosis altered mental status. Differential diagnosis: cardiac           lele 

      arrhythmia, CVA, generalized weakness, hypovolemia, idiopathic dizziness, near-syncope,     

      TIA. Data reviewed: vital signs, nurses notes, lab test result(s), EKG, radiologic          

      studies, CT scan, plain films. Data interpreted: Cardiac monitor: rate is 79 beats/min,     

      rhythm is regular, Pulse oximetry: on room air is 99 %. Test interpretation: by ED          

      physician or midlevel provider: ECG, plain radiologic studies. Counseling: I had a          

      detailed discussion with the patient and/or guardian regarding: the historical points,      

      exam findings, and any diagnostic results supporting the discharge/admit diagnosis, lab     

      results, radiology results.                                                                 

                                                                                                  

                                                                                             

21:18 Order name: Basic Metabolic Panel; Complete Time: 22:32                                 lele 

                                                                                             

21:18 Order name: CBC with Diff; Complete Time: 22:32                                         lele 

                                                                                             

21:18 Order name: LFT's; Complete Time: 22:32                                                 lele 

                                                                                             

21:18 Order name: Magnesium; Complete Time: 22:32                                             lele 

                                                                                             

21:18 Order name: NT PRO-BNP; Complete Time: 22:32                                            lele 

                                                                                             

21:18 Order name: PT-INR; Complete Time: 22:32                                                lele 

                                                                                             

21:18 Order name: Troponin HS; Complete Time: 22:32                                           lele 

                                                                                             

21:18 Order name: XRAY Chest (1 view); Complete Time: 22:32                                   [facility] 

                                                                                             

21:18 Order name: EKG; Complete Time: 21:19                                                   lele 

                                                                                             

21:18 Order name: Cardiac monitoring; Complete Time: 21:19                                    [facility] 

                                                                                             

21:18 Order name: EKG - Nurse/Tech; Complete Time: 21:19                                      lele 

                                                                                             

21:18 Order name: IV Saline Lock; Complete Time: 21:38                                        [facility] 

                                                                                             

21:18 Order name: CT Head Brain wo Cont; Complete Time: 22:32                                 [facility] 

                                                                                             

21:18 Order name: Labs collected and sent; Complete Time: 21:38                               [facility] 

                                                                                             

21:18 Order name: O2 Per Protocol; Complete Time: 21:                                       [facility] 

                                                                                             

21:18 Order name: O2 Sat Monitoring; Complete Time: 21:                                     [facility] 

                                                                                             

22:32 Order name: Orthostatics                                                                lele 

                                                                                                  

EC:20 Rate is 66 beats/min. Rhythm is regular. QRS Axis is Normal. NM interval is normal. QRS lele 

      interval is normal. QT interval is normal. No Q waves. T waves are Normal. No ST            

      changes noted. Clinical impression: NSR w/ Non-specific ST/T Changes and No evidence of     

      ischemia. Interpreted by me. Reviewed by me.                                                

                                                                                                  

Administered Medications:                                                                         

20:30 Drug: NS 0.9% 1000 ml Route: IV; Rate: 1 bolus; Site: left antecubital;                 as6 

23:29 Follow up: Response: No adverse reaction; IV Status: Completed infusion; IV Intake:     as6 

      1000ml                                                                                      

                                                                                                  

                                                                                                  

Disposition Summary:                                                                              

22 22:33                                                                                    

Discharge Ordered                                                                                 

      Location: Home                                                                          lele 

      Problem: new                                                                            lele 

      Symptoms: have improved                                                                 lele 

      Condition: Stable                                                                       lele 

      Diagnosis                                                                                   

        - Dizziness and giddiness                                                             lele 

        - Weakness                                                                            lele 

      Followup:                                                                               lele 

        - With: Private Physician                                                                  

        - When: 1 - 2 days                                                                         

        - Reason: Recheck today's complaints, Continuance of care, Re-evaluation by your           

      physician                                                                                   

      Followup:                                                                               lele 

        - With:                                                                                    

        - When: 1 - 2 days                                                                         

        - Reason: Recheck today's complaints, Continuance of care, Re-evaluation by your           

      physician                                                                                   

      Followup:                                                                               lele 

        - With:                                                                                    

        - When: 1 - 2 days                                                                         

        - Reason: Recheck today's complaints, Re-evaluation by your physician                      

      Discharge Instructions:                                                                     

        - Discharge Summary Sheet                                                             lele 

        - Dizziness                                                                           lele 

        - Weakness                                                                            lele 

        - Near-Syncope, Easy-to-Read                                                          lele 

        - Weakness, Easy-to-Read                                                              lele 

        - Aspirin and Your Heart                                                              lele 

        - Dizziness, Easy-to-Read                                                             lele 

      Forms:                                                                                      

        - Medication Reconciliation Form                                                      lele 

        - Thank You Letter                                                                    lele 

        - Antibiotic Education                                                                lele 

        - Prescription Opioid Use                                                             lele 

Signatures:                                                                                       

Dispatcher [employer]                           EDAndres Bentley MD MD cha Bryson, James, RN                       RN   jb4                                                  

Chan Lisa RN                      RN   as6                                                  

                                                                                                  

Corrections: (The following items were deleted from the chart)                                    

20:03 20:01 PSHx: abdomenal surgery.; helga                                                     jb4 

23:32 21:18 Urine Dipstick-Ancillary ordered. lele                                             as6 

                                                                                                  

**************************************************************************************************

## 2022-07-04 NOTE — ER
Nurse's Notes                                                                                     

 [facility]                                                                 

Name: Dejuan Aguilar                                                                                

Age: 76 yrs                                                                                       

Sex: Male                                                                                         

: 1945                                                                                   

MRN: [id number]                                                                                   

Arrival Date: 2022                                                                          

Time: 19:51                                                                                       

Account#: [id number]                                                                            

Bed 7                                                                                             

Private MD:                                                                                       

Diagnosis: Dizziness and giddiness;Weakness                                                       

                                                                                                  

Presentation:                                                                                     

                                                                                             

19:58 Chief complaint: Spouse and/or significant other states: His eyes rolled to the back of jb4 

      his head, he was very sweaty, he was disoriented. He said his vision went black and         

      there were black spots everywhere. EMS states: We were called out for pt having a           

      syncopal episode. Wife reports pt was Diaphoretic. Vs stable upon EMS arrival, pt awake     

      and alert. Coronavirus screen: At this time, the client does not indicate any symptoms      

      associated with coronavirus-19. Ebola Screen: No symptoms or risks identified at this       

      time. Initial Sepsis Screen: Does the patient meet any 2 criteria? No. Patient's            

      initial sepsis screen is negative. Does the patient have a suspected source of              

      infection? No. Patient's initial sepsis screen is negative. Risk Assessment: Do you         

      want to hurt yourself or someone else? Patient reports no desire to harm self or            

      others. Onset of symptoms was 2022. Transition of care: patient was not            

      received from another setting of care.                                                      

19:58 Method Of Arrival: EMS: [place] [id number] 

19:58 Acuity: HAY 3                                                                           jb4 

                                                                                                  

Triage Assessment:                                                                                

20:01 General: Appears in no apparent distress. comfortable, Behavior is calm, cooperative,   jb4 

      appropriate for age. Pain: Complains of pain in headache Pain does not radiate. Pain        

      currently is 2 out of 10 on a pain scale. Neuro: Level of Consciousness is awake,           

      alert, obeys commands, Oriented to person, place, time, situation, Moves all                

      extremities. Full function Gait is steady, Speech is normal, Facial symmetry appears        

      normal. Cardiovascular: Patient's skin is warm and dry. Respiratory: Airway is patent       

      Respiratory effort is even, unlabored, Respiratory pattern is regular, symmetrical.         

      Derm: Skin is intact, Skin is pink, warm \T\ dry. Musculoskeletal: Circulation, motion,     

      and sensation intact. Range of motion: intact in all extremities.                           

                                                                                                  

Historical:                                                                                       

- Allergies:                                                                                      

20:01 PENICILLINS;                                                                            jb4 

- Home Meds:                                                                                      

20:01 aspirin 81 mg Oral chew 1 tab once daily [Active]; atorvastatin Oral [Active];          jb4 

      lisinopril-hydrochlorothiazide Oral [Active];                                               

- PMHx:                                                                                           

20:01 Hyperlipidemia; Hypertension;                                                           jb4 

- PSHx:                                                                                           

20:01 hernia repair;                                                                          jb4 

                                                                                                  

- Immunization history:: Adult Immunizations up to date.                                          

- Social history:: Smoking status: Patient denies any tobacco usage or history of.                

  Patient uses alcohol, occasionally.                                                             

- Family history:: not pertinent.                                                                 

                                                                                                  

                                                                                                  

Screenin:55 Abuse screen: Denies threats or abuse. Denies injuries from another. Nutritional        as6 

      screening: No deficits noted. Tuberculosis screening: No symptoms or risk factors           

      identified. Fall Risk None identified.                                                      

                                                                                                  

Assessment:                                                                                       

20:50 General: Appears in no apparent distress. comfortable, Behavior is calm, cooperative,   as6 

      Reports "sleepy". Pain: Complains of pain in headache Pain currently is 1.5 out of 10       

      on a pain scale.                                                                            

20:52 General: states "I had 3 martinis today, I was outside for about 15 min grilling        as6 

      vegetables then went inside." Spouse at bedside states "I noticed he was woozy and went     

      lethargic, his eyes rolled to the back of his head. I called 911, His blood pressure        

      was maybe 90/50. I think he would have fallen if I was not right next to him.".             

20:55 Neuro: Level of Consciousness is awake, alert, obeys commands, Oriented to person,      as6 

      place, time, situation,  are equal bilaterally Moves all extremities. Speech is        

      normal, Facial symmetry appears normal.                                                     

                                                                                                  

Vital Signs:                                                                                      

19:58  / 64; Pulse 65; Resp 16; Temp 97.8(O); Pulse Ox 95% ; Weight 78.02 kg (R);       jb4 

      Height 6 ft. 0 in. (182.88 cm) (R); Pain 0/10;                                              

20:51  / 69; Pulse 79; Resp 12 S; Temp 98.8; Pulse Ox 99% on R/A;                       as6 

21:40  / 67; Pulse 64; Resp 16 S; Pulse Ox 96% on R/A;                                  as6 

23:15  / 78; Pulse 60; Resp 15 S; Pulse Ox 99% on R/A; Pain 0/10;                       as6 

19:58 Body Mass Index 23.33 (78.02 kg, 182.88 cm)                                             jb4 

                                                                                                  

ED Course:                                                                                        

19:51 Patient arrived in ED.                                                                  jb4 

20:01 Triage completed.                                                                       jb4 

20:01 Arm band placed on right wrist.                                                         jb4 

20:01 Patient has correct armband on for positive identification. Allergy band placed. Bed in as6 

      low position. Call light in reach. Side rails up X2. Adult w/ patient.                      

20:24 Chan Lisa, RN is Primary Nurse.                                                    as6 

20:24 Andres Bower MD is Attending Physician.                                             lele 

21:30 Inserted saline lock: 20 gauge in left antecubital area, using aseptic technique. Blood as6 

      collected.                                                                                  

21:38 Basic Metabolic Panel Sent.                                                             as6 

21:38 CBC with Diff Sent.                                                                     as6 

21:38 LFT's Sent.                                                                             as6 

21:38 Magnesium Sent.                                                                         as6 

21:38 NT PRO-BNP Sent.                                                                        as6 

21:38 PT-INR Sent.                                                                            as6 

21:38 Troponin HS Sent.                                                                       as6 

21:43 CT Head Brain wo Cont In Process Unspecified.                                           EDMS

21:52 XRAY Chest (1 view) In Process Unspecified.                                             EDMS

22:33 Rashard Perales MD is Referral Physician.                                               lele 

22:33 DESTINEE Lowe MD is Referral Physician.                                                      [facility] 

23:26 No provider procedures requiring assistance completed. IV discontinued, intact,         as6 

      bleeding controlled, No redness/swelling at site. Pressure dressing applied.                

                                                                                                  

Administered Medications:                                                                         

20:30 Drug: NS 0.9% 1000 ml Route: IV; Rate: 1 bolus; Site: left antecubital;                 as6 

23:29 Follow up: Response: No adverse reaction; IV Status: Completed infusion; IV Intake:     as6 

      1000ml                                                                                      

                                                                                                  

                                                                                                  

Medication:                                                                                       

23:28 VIS not applicable for this client.                                                     as6 

                                                                                                  

Intake:                                                                                           

23:29 IV: 1000ml; Total: 1000ml.                                                              as6 

                                                                                                  

Outcome:                                                                                          

22:33 Discharge ordered by MD.                                                                lele 

23:27 Discharged to home ambulatory, with significant other.                                  as6 

23:27 Condition: stable                                                                           

23:27 Discharge instructions given to patient, significant other, Instructed on discharge         

      instructions, follow up and referral plans. Demonstrated understanding of instructions,     

      follow-up care.                                                                             

23:33 Patient left the ED.                                                                    as6 

                                                                                                  

Signatures:                                                                                       

Dispatcher [employer]                           EDMS                                                 

Andres Bower MD MD cha Bryson, James, RN                       RN   jb                                                  

Chan Lisa RN                      RN   as6                                                  

                                                                                                  

Corrections: (The following items were deleted from the chart)                                    

20:03 20:01 PSHx: abdomenal surgery.; jb4                                                     jb4 

                                                                                                  

**************************************************************************************************

## 2022-07-04 NOTE — RAD REPORT
EXAM DESCRIPTION:  CT - Head Brain Wo Cont - 7/4/2022 9:41 pm

 

CLINICAL HISTORY:  Syncope, recurrent

 

COMPARISON:  No comparisons

 

TECHNIQUE:  Axial 5 mm thick images of the head were obtained without IV contrast.

 

All CT scans are performed using dose optimization technique as appropriate and may include automated
 exposure control or mA/KV adjustment according to patient size.

 

FINDINGS:  No intracranial hemorrhage, mass, edema or shift of mid-line structures. No cortical level
 infarction seen. No cortical edema or sulcal effacement. Atrophy is mild for age. Ventricles are in 
proportion to volume loss. No abnormal extra-axial fluid collections. No significant chronic ischemic
 pattern.

 

Mastoid air cells and visualized portions of the paranasal sinuses are clear.

 

No acute bony findings.

 

 

IMPRESSION:  Negative non-contrast CT head examination for acute finding.

## 2022-07-04 NOTE — RAD REPORT
EXAM DESCRIPTION:  RAD - Chest Single View - 7/4/2022 9:50 pm

 

CLINICAL HISTORY:  COUGH

 

COMPARISON:  Portable 11/23/2021

 

TECHNIQUE:  AP portable chest image was obtained 7/4/2022 9:50 pm .

 

FINDINGS:  Single portable view was present. Each lung apex is cut off the field of view. Top of the 
aortic arch also cut off the field of view. No acute lung parenchymal process seen. No significant fa
ilure or volume overload.

 

 Heart and vasculature are normal. No measurable pleural effusion and no pneumothorax. No acute bony 
abnormality seen. No acute aortic findings suspected.

 

IMPRESSION:  Limited portable study without acute cardiopulmonary finding

## 2022-07-05 NOTE — EKG
Test Date:    2022-07-04               Test Time:    20:49:51

Technician:   ABA                                     

                                                     

MEASUREMENT RESULTS:                                       

Intervals:                                           

Rate:         66                                     

KS:           150                                    

QRSD:         106                                    

QT:           436                                    

QTc:          457                                    

Axis:                                                

P:            50                                     

KS:           150                                    

QRS:          -2                                     

T:            40                                     

                                                     

INTERPRETIVE STATEMENTS:                                       

                                                     

Normal sinus rhythm

Minimal voltage criteria for LVH, may be normal variant

Septal infarct, age undetermined

Abnormal ECG

Compared to ECG 11/23/2018 13:16:09

Myocardial infarct finding now present

Left-axis deviation no longer present



Electronically Signed On 07-05-22 07:55:03 CDT by Rashard Perales

## 2024-10-01 NOTE — EDPHYS
Physician Documentation                                                                           

 [facility]                                                                

Name: Dejuan Aguilar                                                                                

Age: 73 yrs                                                                                       

Sex: Male                                                                                         

: 1945                                                                                   

MRN: [id number]                                                                                   

Arrival Date: 2018                                                                          

Time: 12:48                                                                                       

Account#: [id number]                                                                            

Bed 13                                                                                            

Private MD: Heriberto Harris ED Physician Andres Bower                                                                      

HPI:                                                                                              

                                                                                             

13:38 This 73 yrs old  Male presents to ER via EMS with complaints of Syncope.       jr8 

13:38 The patient has experienced syncope. Onset: The symptoms/episode began/occurred         jr8 

      acutely, today. Duration: This was a single episode. Context: the episode(s) was            

      witnessed, by family, occurred at home, occurred while the patient was sitting, Just        

      prior to the episode the patient experienced lightheadedness, nausea. Associated            

      injury: Head/face: Neck:. Associated signs and symptoms: The patient has no apparent        

      associated signs or symptoms. Current symptoms: Currently, the patient is not               

      experiencing any symptoms, the patient feels back to baseline, no decreased level of        

      consciousness, no confusion, no dysphasia, no headache, no paralysis, no visual             

      changes. The patient has not experienced similar symptoms in the past. The patient has      

      not recently seen a physician. Patient woke up nauseated. Had several soft stool bowel      

      movements this morning. Was on the toilet and started to feel nauseated again. Was          

      getting up to turn around to vomit and then the next thing he remembers is family           

      helping him off of the floor. Did not take his BP medications this morning .                

                                                                                                  

Historical:                                                                                       

- Allergies:                                                                                      

12:57 PENICILLINS;                                                                            tw2 

- Home Meds:                                                                                      

12:57 Cymbalta oral oral [Active]; lisinopril-hydrochlorothiazide oral oral [Active];         tw2 

      atorvastatin oral oral [Active]; aspirin 81 mg Oral chew 1 tab once daily [Active];         

- PMHx:                                                                                           

12:57 Hypertension; Hyperlipidemia;                                                           tw2 

                                                                                                  

- Immunization history:: Adult Immunizations.                                                     

- Social history:: Smoking status: .                                                              

- Ebola Screening: : Patient denies travel to an Ebola-affected area in the 21 days               

  before illness onset.                                                                           

                                                                                                  

                                                                                                  

ROS:                                                                                              

13:38 Eyes: Negative for injury, pain, redness, and discharge, ENT: Negative for injury,      jr8 

      pain, and discharge, Neck: Negative for injury, pain, and swelling, Cardiovascular:         

      Negative for chest pain, palpitations, and edema, Respiratory: Negative for shortness       

      of breath, cough, wheezing, and pleuritic chest pain, Back: Negative for injury and         

      pain, MS/Extremity: Negative for injury and deformity, Skin: Negative for injury, rash,     

      and discoloration.                                                                          

13:38 Abdomen/GI: Positive for abdominal pain, nausea and vomiting, Negative for abdominal        

      distension, anorexia, dysphagia, hematemesis, black/tarry stool, rectal pain, rectal        

      bleeding, bowel incontinence, flatulence.                                                   

13:38 Neuro: Positive for syncope.                                                                

                                                                                                  

Exam:                                                                                             

13:38 Eyes:  Pupils equal round and reactive to light, extra-ocular motions intact.  Lids and jr8 

      lashes normal.  Conjunctiva and sclera are non-icteric and not injected.  Cornea within     

      normal limits.  Periorbital areas with no swelling, redness, or edema. ENT:  Nares          

      patent. No nasal discharge, no septal abnormalities noted.  Tympanic membranes are          

      normal and external auditory canals are clear.  Oropharynx with no redness, swelling,       

      or masses, exudates, or evidence of obstruction, uvula midline.  Mucous membranes           

      moist. Neck:  Trachea midline, no thyromegaly or masses palpated, and no cervical           

      lymphadenopathy.  Supple, full range of motion without nuchal rigidity, or vertebral        

      point tenderness.  No Meningismus. Cardiovascular:  Regular rate and rhythm with a          

      normal S1 and S2.  No gallops, murmurs, or rubs.  Normal PMI, no JVD.  No pulse             

      deficits. Respiratory:  Lungs have equal breath sounds bilaterally, clear to                

      auscultation and percussion.  No rales, rhonchi or wheezes noted.  No increased work of     

      breathing, no retractions or nasal flaring. Abdomen/GI:  Soft, non-tender, with normal      

      bowel sounds.  No distension or tympany.  No guarding or rebound.  No evidence of           

      tenderness throughout. Ventral hernia present and without pain  Back:  No spinal            

      tenderness.  No costovertebral tenderness.  Full range of motion. Skin:  Warm, dry with     

      normal turgor.  Normal color with no rashes, no lesions, and no evidence of cellulitis.     

      MS/ Extremity:  Pulses equal, no cyanosis.  Neurovascular intact.  Full, normal range       

      of motion. Neuro:  Awake and alert, GCS 15, oriented to person, place, time, and            

      situation.  Cranial nerves II-XII grossly intact.  Motor strength 5/5 in all                

      extremities.  Sensory grossly intact.  Cerebellar exam normal.  Normal gait.                

                                                                                                  

Vital Signs:                                                                                      

12:54 BP 98 / 63; Pulse 87; Resp 17; Temp 97.6(TE); Pulse Ox 98% on R/A;                      tw2 

12:54 Weight 82.55 kg (R); Height 6 ft. 0 in. (182.88 cm); Pain 0/10;                         tw2 

13:47  / 72 Supine; Pulse 78; Resp 14; Pulse Ox 97% on 2 lpm NC;                        tw2 

14:08  / 86 Sitting; Pulse 90;                                                          tw2 

14:08  / 79 Supine; Pulse 81; Resp 17; Pulse Ox 99% on 2 lpm NC;                        tw2 

14:47  / 76; Pulse 81; Resp 19; Pulse Ox 99% on R/A;                                    tw2 

15:15  / 80; Pulse 69; Resp 17; Pulse Ox 96% on R/A;                                    tw2 

16:03  / 70; Pulse 72; Resp 20; Pulse Ox 95% on R/A;                                    tw2 

17:00  / 90; Pulse 79; Resp 17; Pulse Ox 95% on R/A;                                    tw2 

17:50  / 77; Pulse 88; Resp 17; Pulse Ox 95% on R/A;                                    tw2 

12:54 Body Mass Index 24.68 (82.55 kg, 182.88 cm)                                             tw2 

12:54 "no pain, just stomach discomfort"                                                      tw2 

13:47 pt placed on 2l nc at "seizure" episode                                                 tw2 

14:08 provider JERALD Nash PA at bedside                                                          tw2 

                                                                                                  

MDM:                                                                                              

12:50 Patient medically screened.                                                             jr8 

13:38 ED course: Patient while sitting up had syncopal episode in exam bed. Came too fast.    jr8 

      Another Liter Bolus started .                                                               

15:30 Data reviewed: vital signs, nurses notes, lab test result(s), EKG, radiologic studies,  jr8 

      CT scan, plain films, and as a result, I will admit patient. Data interpreted: Pulse        

      oximetry: on room air is 99 %. Interpretation: normal. Counseling: I had a detailed         

      discussion with the patient and/or guardian regarding: the historical points, exam          

      findings, and any diagnostic results supporting the discharge/admit diagnosis, lab          

      results, radiology results, the need for further work-up and treatment in the hospital.     

      Physician consultation: Obdulia Conner MD was called at 15:30, was contacted at 15:30,       

      regarding admission, to the telemetry unit. consult, patient's condition, and will see      

      patient.                                                                                    

                                                                                                  

                                                                                             

13:05 Order name: Basic Metabolic Panel; Complete Time: 13:54                                                                                                                              

13:05 Order name: CBC with Diff; Complete Time: 13:32                                                                                                                                      

13:05 Order name: LFT's; Complete Time: 13:54                                                                                                                                              

13:05 Order name: Magnesium; Complete Time: 13:54                                                                                                                                          

13:05 Order name: NT PRO-BNP; Complete Time: 13:54                                                                                                                                         

13:05 Order name: PT-INR; Complete Time: 13:32                                                                                                                                             

13:05 Order name: Troponin (emerg Dept Use Only); Complete Time: 13:54                                                                                                                     

13:05 Order name: XRAY Chest (1 view); Complete Time: 15:53                                                                                                                                

14:05 Order name: CT Head C Spine; Complete Time: 15:00                                                                                                                                    

14:05 Order name: CT Abd/Pelvis - W/Contrast; Complete Time: 15:19                                                                                                                         

13:05 Order name: EKG; Complete Time: 13:07                                                                                                                                                

13:05 Order name: Cardiac monitoring; Complete Time: 13:18                                                                                                                                 

13:05 Order name: EKG - Nurse/Tech; Complete Time: 13:18                                                                                                                                   

13:05 Order name: IV Saline Lock; Complete Time: 13:18                                                                                                                                     

13:05 Order name: Labs collected and sent; Complete Time: 13:18                                                                                                                            

13:05 Order name: O2 Per Protocol; Complete Time: 13:17                                                                                                                                    

13:05 Order name: O2 Sat Monitoring; Complete Time: 13:                                                                                             

16:21 Order name: Diet Clear Liquid; Complete Time: 16:21                                     tw2 

                                                                                                  

Administered Medications:                                                                         

13:20 Drug: NS 0.9% 1000 ml Route: IV; Rate: 1 bolus; Site: left antecubital;                 tw2 

14:08 Follow up: Response: No adverse reaction; IV Status: Completed infusion; IV Intake:     tw2 

      1000ml                                                                                      

13:26 Drug: Zofran 4 mg Route: IVP; Site: left antecubital;                                   tw2 

14:08 Follow up: Response: No adverse reaction; Nausea is decreased                           tw2 

15:40 Drug: ProTONIX 40 mg Route: IVP; Site: left antecubital;                                tw2 

16:21 Follow up: Response: No adverse reaction                                                tw2 

15:45 Drug: Flagyl 500 mg Volume: 100 ml; Route: IVPB; Rate: 200 ml/hr; Infused Over: 30      tw2 

      mins; Site: left antecubital;                                                               

16:21 Follow up: Response: No adverse reaction; IV Status: Completed infusion                 tw2 

15:45 Drug: NS 0.9% 1000 ml Route: IV; Rate: 125 ml/hr; Site: left antecubital;               tw2 

16:50 Follow up: IV Status: Infusion continued upon admission                                 tw2 

16:21 Drug: Cipro 400 mg Volume: 200 ml; Route: IVPB; Infused Over: 60 mins; Site: left       tw2 

      antecubital;                                                                                

17:53 Follow up: Response: No adverse reaction; IV Status: Completed infusion                 tw2 

                                                                                                  

                                                                                                  

Disposition:                                                                                      

18 15:31 Hospitalization ordered by Obdulia Conner for Inpatient Admission. Preliminary     

  diagnosis are Dehydration, Orthostatic hypotension, Syncope and collapse,                       

  Colitis.                                                                                        

- Bed requested for Telemetry/MedSurg (Inpatient).                                                

- Status is Inpatient Admission.                                                              ss  

- Condition is Stable.                                                                            

- Problem is new.                                                                                 

- Symptoms have improved.                                                                         

UTI on Admission? No                                                                              

                                                                                                  

                                                                                                  

                                                                                                  

Addendum:                                                                                         

2018                                                                                        

     08:12 Co-signature as Attending Physician, Andres Bower MD I agree with the assessment and  c
ha

           plan of care.                                                                          

                                                                                                  

Signatures:                                                                                       

Dispatcher [employer]                           [facility]                                                 

Pilar Brownlee RN RN dw Anderson, Corey, MD MD cha Smirch, Shelby, RN                      RN   ss                                                   

Saad Banda PA PA   jr8                                                  

Melanie Fontana RN                          RN   tw2                                                  

                                                                                                  

Corrections: (The following items were deleted from the chart)                                    

                                                                                             

16:43 15:31 Hospitalization Ordered by Obdulia Conner MD for Inpatient Admission. Preliminary  dw  

      diagnosis is Dehydration; Orthostatic hypotension; Syncope and collapse; Colitis. Bed       

      requested for Telemetry/MedSurg (Inpatient). Status is Inpatient Admission. Condition       

      is Stable. Problem is new. Symptoms have improved. UTI on Admission? No. jr8                

18:02 16:43 2018 15:31 Hospitalization Ordered by Obdulia Conner MD for Inpatient          

      Admission. Preliminary diagnosis is Dehydration; Orthostatic hypotension; Syncope and       

      collapse; Colitis. Bed requested for Telemetry/MedSurg (Inpatient). Status is Inpatient     

      Admission. Condition is Stable. Problem is new. Symptoms have improved. UTI on              

      Admission? No. dw                                                                           

                                                                                                  

************************************************************************************************** Epidural